# Patient Record
Sex: MALE | Race: WHITE | NOT HISPANIC OR LATINO | Employment: FULL TIME | URBAN - METROPOLITAN AREA
[De-identification: names, ages, dates, MRNs, and addresses within clinical notes are randomized per-mention and may not be internally consistent; named-entity substitution may affect disease eponyms.]

---

## 2023-01-15 ENCOUNTER — HOSPITAL ENCOUNTER (OUTPATIENT)
Dept: CT IMAGING | Facility: CLINIC | Age: 68
Discharge: HOME | End: 2023-01-15
Attending: UROLOGY | Admitting: UROLOGY
Payer: MEDICARE

## 2023-01-15 DIAGNOSIS — C61 MALIGNANT TUMOR OF PROSTATE (H): ICD-10-CM

## 2023-01-15 PROCEDURE — 74177 CT ABD & PELVIS W/CONTRAST: CPT

## 2023-01-15 PROCEDURE — 250N000011 HC RX IP 250 OP 636: Performed by: UROLOGY

## 2023-01-15 PROCEDURE — 250N000009 HC RX 250: Performed by: UROLOGY

## 2023-01-15 RX ORDER — IOPAMIDOL 755 MG/ML
111 INJECTION, SOLUTION INTRAVASCULAR ONCE
Status: COMPLETED | OUTPATIENT
Start: 2023-01-15 | End: 2023-01-15

## 2023-01-15 RX ADMIN — SODIUM CHLORIDE 72 ML: 9 INJECTION, SOLUTION INTRAVENOUS at 08:20

## 2023-01-15 RX ADMIN — IOPAMIDOL 111 ML: 755 INJECTION, SOLUTION INTRAVENOUS at 08:19

## 2023-01-19 ENCOUNTER — HOSPITAL ENCOUNTER (OUTPATIENT)
Dept: NUCLEAR MEDICINE | Facility: CLINIC | Age: 68
Setting detail: NUCLEAR MEDICINE
Discharge: HOME | End: 2023-01-19
Attending: UROLOGY
Payer: MEDICARE

## 2023-01-19 DIAGNOSIS — C61 MALIGNANT TUMOR OF PROSTATE (H): ICD-10-CM

## 2023-01-19 PROCEDURE — 78306 BONE IMAGING WHOLE BODY: CPT

## 2023-01-19 PROCEDURE — 343N000001 HC RX 343: Performed by: UROLOGY

## 2023-01-19 PROCEDURE — A9503 TC99M MEDRONATE: HCPCS | Performed by: UROLOGY

## 2023-01-19 RX ORDER — TC 99M MEDRONATE 20 MG/10ML
25 INJECTION, POWDER, LYOPHILIZED, FOR SOLUTION INTRAVENOUS ONCE
Status: COMPLETED | OUTPATIENT
Start: 2023-01-19 | End: 2023-01-19

## 2023-01-19 RX ADMIN — TC 99M MEDRONATE 27.3 MCI.: 20 INJECTION, POWDER, LYOPHILIZED, FOR SOLUTION INTRAVENOUS at 08:32

## 2023-02-22 ENCOUNTER — LAB REQUISITION (OUTPATIENT)
Dept: LAB | Facility: CLINIC | Age: 68
End: 2023-02-22

## 2023-02-22 PROCEDURE — 88342 IMHCHEM/IMCYTCHM 1ST ANTB: CPT | Mod: TC | Performed by: SPECIALIST

## 2023-04-09 ENCOUNTER — HEALTH MAINTENANCE LETTER (OUTPATIENT)
Age: 68
End: 2023-04-09

## 2023-10-26 ASSESSMENT — ENCOUNTER SYMPTOMS
PALPITATIONS: 0
FREQUENCY: 0
SORE THROAT: 0
DIARRHEA: 0
HEMATOCHEZIA: 0
MYALGIAS: 0
HEADACHES: 0
EYE PAIN: 0
HEARTBURN: 0
CONSTIPATION: 0
NERVOUS/ANXIOUS: 0
FEVER: 0
DYSURIA: 1
JOINT SWELLING: 0
WEAKNESS: 0
PARESTHESIAS: 0
ARTHRALGIAS: 0
ABDOMINAL PAIN: 0
SHORTNESS OF BREATH: 0
NAUSEA: 0
CHILLS: 0
HEMATURIA: 0
DIZZINESS: 0
COUGH: 0

## 2023-10-26 ASSESSMENT — ACTIVITIES OF DAILY LIVING (ADL): CURRENT_FUNCTION: NO ASSISTANCE NEEDED

## 2023-11-02 ENCOUNTER — OFFICE VISIT (OUTPATIENT)
Dept: FAMILY MEDICINE | Facility: CLINIC | Age: 68
End: 2023-11-02
Payer: MEDICARE

## 2023-11-02 VITALS
OXYGEN SATURATION: 97 % | HEART RATE: 67 BPM | HEIGHT: 72 IN | TEMPERATURE: 98.4 F | RESPIRATION RATE: 16 BRPM | WEIGHT: 195 LBS | SYSTOLIC BLOOD PRESSURE: 106 MMHG | BODY MASS INDEX: 26.41 KG/M2 | DIASTOLIC BLOOD PRESSURE: 70 MMHG

## 2023-11-02 DIAGNOSIS — Z00.00 ENCOUNTER FOR MEDICARE ANNUAL WELLNESS EXAM: ICD-10-CM

## 2023-11-02 DIAGNOSIS — Z23 NEED FOR VACCINATION: ICD-10-CM

## 2023-11-02 DIAGNOSIS — Z00.00 ROUTINE GENERAL MEDICAL EXAMINATION AT A HEALTH CARE FACILITY: Primary | ICD-10-CM

## 2023-11-02 DIAGNOSIS — Z13.1 SCREENING FOR DIABETES MELLITUS: ICD-10-CM

## 2023-11-02 DIAGNOSIS — Z13.220 LIPID SCREENING: ICD-10-CM

## 2023-11-02 DIAGNOSIS — Z11.59 NEED FOR HEPATITIS C SCREENING TEST: ICD-10-CM

## 2023-11-02 DIAGNOSIS — E56.9 VITAMIN DEFICIENCY: ICD-10-CM

## 2023-11-02 DIAGNOSIS — Z12.11 SCREEN FOR COLON CANCER: ICD-10-CM

## 2023-11-02 PROBLEM — C61 MALIGNANT NEOPLASM OF PROSTATE (H): Status: ACTIVE | Noted: 2023-01-19

## 2023-11-02 PROCEDURE — 91320 SARSCV2 VAC 30MCG TRS-SUC IM: CPT | Performed by: FAMILY MEDICINE

## 2023-11-02 PROCEDURE — G0439 PPPS, SUBSEQ VISIT: HCPCS | Performed by: FAMILY MEDICINE

## 2023-11-02 PROCEDURE — G0008 ADMIN INFLUENZA VIRUS VAC: HCPCS | Performed by: FAMILY MEDICINE

## 2023-11-02 PROCEDURE — 90480 ADMN SARSCOV2 VAC 1/ONLY CMP: CPT | Performed by: FAMILY MEDICINE

## 2023-11-02 PROCEDURE — 90662 IIV NO PRSV INCREASED AG IM: CPT | Performed by: FAMILY MEDICINE

## 2023-11-02 RX ORDER — CHOLECALCIFEROL (VITAMIN D3) 50 MCG
1 TABLET ORAL DAILY
COMMUNITY
Start: 2023-11-02

## 2023-11-02 RX ORDER — TAMSULOSIN HYDROCHLORIDE 0.4 MG/1
0.8 CAPSULE ORAL DAILY
COMMUNITY

## 2023-11-02 RX ORDER — ASPIRIN 81 MG/1
81 TABLET ORAL DAILY
COMMUNITY

## 2023-11-02 RX ORDER — MULTIVIT WITH MINERALS/LUTEIN
250 TABLET ORAL DAILY
COMMUNITY
Start: 2023-11-02

## 2023-11-02 ASSESSMENT — ENCOUNTER SYMPTOMS
HEMATOCHEZIA: 0
JOINT SWELLING: 0
HEARTBURN: 0
WEAKNESS: 0
COUGH: 0
MYALGIAS: 0
NERVOUS/ANXIOUS: 0
PALPITATIONS: 0
FREQUENCY: 0
PARESTHESIAS: 0
SHORTNESS OF BREATH: 0
DIZZINESS: 0
ABDOMINAL PAIN: 0
HEADACHES: 0
ARTHRALGIAS: 0
DIARRHEA: 0
NAUSEA: 0
EYE PAIN: 0
CHILLS: 0
HEMATURIA: 0
FEVER: 0
SORE THROAT: 0
DYSURIA: 1
CONSTIPATION: 0

## 2023-11-02 ASSESSMENT — ACTIVITIES OF DAILY LIVING (ADL): CURRENT_FUNCTION: NO ASSISTANCE NEEDED

## 2023-11-02 NOTE — PROGRESS NOTES
"SUBJECTIVE:   Tyrone is a 67 year old who presents for Preventive Visit.      11/2/2023     3:57 PM   Additional Questions   Roomed by SAMSON         11/2/2023     3:58 PM   Patient Reported Additional Medications   Patient reports taking the following new medications VITAMINC,D FISH OIL, BBY ASPIRIN, 2 0.4 TAMSULOSON       Are you in the first 12 months of your Medicare coverage?  No    Healthy Habits:     In general, how would you rate your overall health?  Excellent    Frequency of exercise:  4-5 days/week    Duration of exercise:  15-30 minutes    Do you usually eat at least 4 servings of fruit and vegetables a day, include whole grains    & fiber and avoid regularly eating high fat or \"junk\" foods?  Yes    Taking medications regularly:  Yes    Medication side effects:  Not applicable    Ability to successfully perform activities of daily living:  No assistance needed    Home Safety:  No safety concerns identified    Hearing Impairment:  No hearing concerns    In the past 6 months, have you been bothered by leaking of urine? Yes    In general, how would you rate your overall mental or emotional health?  Excellent    Additional concerns today:  Yes    April started radiation  at  [facility]  for prostate cancer     Working with a urologist there   Urinating 20 times a year       Blood pressure going well   Pulse normal  Exercises regularly   64 flights a day, 4 days a week     Goes to bed for many hours, but is urinating a lot           Have you ever done Advance Care Planning? (For example, a Health Directive, POLST, or a discussion with a medical provider or your loved ones about your wishes): No, advance care planning information given to patient to review.  Patient plans to discuss their wishes with loved ones or provider.        Fall risk  Fallen 2 or more times in the past year?: No  Any fall with injury in the past year?: No    Cognitive Screening   1) Repeat 3 items (Leader, Season, Table)    2) Clock draw: " NORMAL  3) 3 item recall:   Recalls 3 objects  Results: 3 items recalled: COGNITIVE IMPAIRMENT LESS LIKELY    Mini-CogTM Copyright CHRISTIAN Finnegan. Licensed by the author for use in [facility]; reprinted with permission (melba@[facility]). All rights reserved.      Do you have sleep apnea, excessive snoring or daytime drowsiness? : no    Reviewed and updated as needed this visit by clinical staff   Tobacco  Allergies               Reviewed and updated as needed this visit by Provider                 Social History     Tobacco Use    Smoking status: Former    Smokeless tobacco: Not on file   Substance Use Topics    Alcohol use: Not on file             10/26/2023     9:02 AM   Alcohol Use   Prescreen: >3 drinks/day or >7 drinks/week? Not Applicable          No data to display              Do you have a current opioid prescription? No  Do you use any other controlled substances or medications that are not prescribed by a provider? None        Current providers sharing in care for this patient include: Patient Care Team:  No Ref-Primary, Physician as PCP - General  Herbert Khalil MD as MD (Urology)    The following health maintenance items are reviewed in Epic and correct as of today:  Health Maintenance   Topic Date Due    HEPATITIS C SCREENING  Never done    LUNG CANCER SCREENING  Never done    RSV VACCINE (Pregnancy & 60+) (1 - 1-dose 60+ series) Never done    MEDICARE ANNUAL WELLNESS VISIT  Never done    Pneumococcal Vaccine: 65+ Years (1 - PCV) Never done    PHQ-2 (once per calendar year)  Never done    ZOSTER IMMUNIZATION (2 of 2) 01/06/2023    ANNUAL REVIEW OF HM ORDERS  11/02/2024    FALL RISK ASSESSMENT  11/02/2024    COLORECTAL CANCER SCREENING  12/31/2027    ADVANCE CARE PLANNING  11/02/2028    LIPID  11/03/2028    DTAP/TDAP/TD IMMUNIZATION (3 - Td or Tdap) 11/11/2032    INFLUENZA VACCINE  Completed    AORTIC ANEURYSM SCREENING (SYSTEM ASSIGNED)  Completed    COVID-19 Vaccine  Completed    IPV  "IMMUNIZATION  Aged Out    HPV IMMUNIZATION  Aged Out    MENINGITIS IMMUNIZATION  Aged Out    RSV MONOCLONAL ANTIBODY  Aged Out     Review of Systems   Constitutional:  Negative for chills and fever.   HENT:  Negative for congestion, ear pain, hearing loss and sore throat.    Eyes:  Negative for pain and visual disturbance.   Respiratory:  Negative for cough and shortness of breath.    Cardiovascular:  Negative for chest pain, palpitations and peripheral edema.   Gastrointestinal:  Negative for abdominal pain, constipation, diarrhea, heartburn, hematochezia and nausea.   Genitourinary:  Positive for dysuria and urgency. Negative for frequency, genital sores, hematuria, impotence and penile discharge.   Musculoskeletal:  Negative for arthralgias, joint swelling and myalgias.   Skin:  Negative for rash.   Neurological:  Negative for dizziness, weakness, headaches and paresthesias.   Psychiatric/Behavioral:  Negative for mood changes. The patient is not nervous/anxious.      OBJECTIVE:   /70 (BP Location: Right arm, Patient Position: Sitting, Cuff Size: Adult Regular)   Pulse 67   Temp 98.4  F (36.9  C) (Temporal)   Resp 16   Ht 1.839 m (6' 0.4\")   Wt 88.5 kg (195 lb)   SpO2 97%   BMI 26.15 kg/m   Estimated body mass index is 26.15 kg/m  as calculated from the following:    Height as of this encounter: 1.839 m (6' 0.4\").    Weight as of this encounter: 88.5 kg (195 lb).  Physical Exam    ASSESSMENT / PLAN:       ICD-10-CM    1. Routine general medical examination at a health care facility  Z00.00 REVIEW OF HEALTH MAINTENANCE PROTOCOL ORDERS      2. Screen for colon cancer  Z12.11 Colonoscopy Screening  Referral      3. Need for hepatitis C screening test  Z11.59       4. Need for vaccination  Z23 INFLUENZA VACCINE 65+ (FLUZONE HD)     COVID-19 12+ (2023-24) (PFIZER)      5. Lipid screening  Z13.220 Lipid panel reflex to direct LDL Non-fasting      6. Vitamin deficiency  E56.9 vitamin C (ASCORBIC " "ACID) 250 MG tablet     vitamin D3 (CHOLECALCIFEROL) 50 mcg (2000 units) tablet      7. Screening for diabetes mellitus  Z13.1 Basic metabolic panel  (Ca, Cl, CO2, Creat, Gluc, K, Na, BUN)          Patient has been advised of split billing requirements and indicates understanding: Yes      COUNSELING:  Reviewed preventive health counseling, as reflected in patient instructions      BMI:   Estimated body mass index is 26.15 kg/m  as calculated from the following:    Height as of this encounter: 1.839 m (6' 0.4\").    Weight as of this encounter: 88.5 kg (195 lb).   Weight management plan: Discussed healthy diet and exercise guidelines      He reports that he has quit smoking. He does not have any smokeless tobacco history on file.      Appropriate preventive services were discussed with this patient, including applicable screening as appropriate for fall prevention, nutrition, physical activity, Tobacco-use cessation, weight loss and cognition.  Checklist reviewing preventive services available has been given to the patient.      Mark Baez DO  [facility]    Identified Health Risks:  Answers submitted by the patient for this visit:  Annual Preventive Visit (Submitted on 10/26/2023)  Chief Complaint: Annual Exam:  In general, how would you rate your overall physical health?: excellent  Frequency of exercise:: 4-5 days/week  Do you usually eat at least 4 servings of fruit and vegetables a day, include whole grains & fiber, and avoid regularly eating high fat or \"junk\" foods? : Yes  Taking medications regularly:: Yes  Medication side effects:: Not applicable  Activities of Daily Living: no assistance needed  Home safety: no safety concerns identified  Hearing Impairment:: no hearing concerns  In the past 6 months, have you been bothered by leaking of urine?: Yes  abdominal pain: No  Blood in stool: No  Blood in urine: No  chest pain: No  chills: No  congestion: No  constipation: No  cough: " No  diarrhea: No  dizziness: No  ear pain: No  eye pain: No  nervous/anxious: No  fever: No  frequency: No  genital sores: No  headaches: No  hearing loss: No  heartburn: No  arthralgias: No  joint swelling: No  peripheral edema: No  mood changes: No  myalgias: No  nausea: No  dysuria: Yes  palpitations: No  Skin sensation changes: No  sore throat: No  urgency: Yes  rash: No  shortness of breath: No  visual disturbance: No  weakness: No  impotence: No  penile discharge: No  In general, how would you rate your overall mental or emotional health?: excellent  Additional concerns today:: Yes  Exercise outside of work (Submitted on 10/26/2023)  Chief Complaint: Annual Exam:  Duration of exercise:: 15-30 minutes  Information on urinary incontinence and treatment options given to patient.

## 2023-11-02 NOTE — PATIENT INSTRUCTIONS
I recommend shingrix vaccine , pneumonia vaccine at the pharmacy.  Not covered by medicare in clinic.    Patient Education   Personalized Prevention Plan  You are due for the preventive services outlined below.  Your care team is available to assist you in scheduling these services.  If you have already completed any of these items, please share that information with your care team to update in your medical record.  Health Maintenance Due   Topic Date Due     Hepatitis C Screening  Never done     LUNG CANCER SCREENING  Never done     RSV VACCINE (Pregnancy & 60+) (1 - 1-dose 60+ series) Never done     Pneumococcal Vaccine (1 - PCV) Never done     PHQ-2 (once per calendar year)  Never done     Zoster (Shingles) Vaccine (2 of 2) 01/06/2023     Bladder Training: Care Instructions  Your Care Instructions     Bladder training is used to treat urge incontinence and stress incontinence. Urge incontinence means that the need to urinate comes on so fast that you can't get to a toilet in time. Stress incontinence means that you leak urine because of pressure on your bladder. For example, it may happen when you laugh, cough, or lift something heavy.  Bladder training can increase how long you can wait before you have to urinate. It can also help your bladder hold more urine. And it can give you better control over the urge to urinate.  It is important to remember that bladder training takes a few weeks to a few months to make a difference. You may not see results right away, but don't give up.  Follow-up care is a key part of your treatment and safety. Be sure to make and go to all appointments, and call your doctor if you are having problems. It's also a good idea to know your test results and keep a list of the medicines you take.  How can you care for yourself at home?  Work with your doctor to come up with a bladder training program that is right for you. You may use one or more of the following methods.  Delayed  "urination  In the beginning, try to keep from urinating for 5 minutes after you first feel the need to go.  While you wait, take deep, slow breaths to relax. Kegel exercises can also help you delay the need to go to the bathroom.  After some practice, when you can easily wait 5 minutes to urinate, try to wait 10 minutes before you urinate.  Slowly increase the waiting period until you are able to control when you have to urinate.  Scheduled urination  Empty your bladder when you first wake up in the morning.  Schedule times throughout the day when you will urinate.  Start by going to the bathroom every hour, even if you don't need to go.  Slowly increase the time between trips to the bathroom.  When you have found a schedule that works well for you, keep doing it.  If you wake up during the night and have to urinate, do it. Apply your schedule to waking hours only.  Kegel exercises  These tighten and strengthen pelvic muscles, which can help you control the flow of urine. (If doing these exercises causes pain, stop doing them and talk with your doctor.) To do Kegel exercises:  Squeeze your muscles as if you were trying not to pass gas. Or squeeze your muscles as if you were stopping the flow of urine. Your belly, legs, and buttocks shouldn't move.  Hold the squeeze for 3 seconds, then relax for 5 to 10 seconds.  Start with 3 seconds, then add 1 second each week until you are able to squeeze for 10 seconds.  Repeat the exercise 10 times a session. Do 3 to 8 sessions a day.  When should you call for help?  Watch closely for changes in your health, and be sure to contact your doctor if:    Your incontinence is getting worse.     You do not get better as expected.   Where can you learn more?  Go to https://www.[employer].net/patiented  Enter V684 in the search box to learn more about \"Bladder Training: Care Instructions.\"  Current as of: February 28, 2023               Content Version: 13.8    9009-5215 Healthwise, " Incorporated.   Care instructions adapted under license by your healthcare professional. If you have questions about a medical condition or this instruction, always ask your healthcare professional. Healthwise, Incorporated disclaims any warranty or liability for your use of this information.

## 2023-11-03 ENCOUNTER — LAB (OUTPATIENT)
Dept: LAB | Facility: CLINIC | Age: 68
End: 2023-11-03
Attending: FAMILY MEDICINE
Payer: MEDICARE

## 2023-11-03 DIAGNOSIS — Z13.220 LIPID SCREENING: ICD-10-CM

## 2023-11-03 DIAGNOSIS — Z13.1 SCREENING FOR DIABETES MELLITUS: ICD-10-CM

## 2023-11-03 LAB
ANION GAP SERPL CALCULATED.3IONS-SCNC: 9 MMOL/L (ref 7–15)
BUN SERPL-MCNC: 14.3 MG/DL (ref 8–23)
CALCIUM SERPL-MCNC: 9.1 MG/DL (ref 8.8–10.2)
CHLORIDE SERPL-SCNC: 103 MMOL/L (ref 98–107)
CHOLEST SERPL-MCNC: 207 MG/DL
CREAT SERPL-MCNC: 0.81 MG/DL (ref 0.67–1.17)
DEPRECATED HCO3 PLAS-SCNC: 28 MMOL/L (ref 22–29)
EGFRCR SERPLBLD CKD-EPI 2021: >90 ML/MIN/1.73M2
GLUCOSE SERPL-MCNC: 82 MG/DL (ref 70–99)
HDLC SERPL-MCNC: 53 MG/DL
LDLC SERPL CALC-MCNC: 145 MG/DL
NONHDLC SERPL-MCNC: 154 MG/DL
POTASSIUM SERPL-SCNC: 3.8 MMOL/L (ref 3.4–5.3)
SODIUM SERPL-SCNC: 140 MMOL/L (ref 135–145)
TRIGL SERPL-MCNC: 45 MG/DL

## 2023-11-03 PROCEDURE — 80061 LIPID PANEL: CPT

## 2023-11-03 PROCEDURE — 36415 COLL VENOUS BLD VENIPUNCTURE: CPT

## 2023-11-03 PROCEDURE — 80048 BASIC METABOLIC PNL TOTAL CA: CPT

## 2023-11-08 ENCOUNTER — TRANSCRIBE ORDERS (OUTPATIENT)
Dept: OTHER | Age: 68
End: 2023-11-08

## 2023-11-08 DIAGNOSIS — C61 PROSTATE CANCER (H): Primary | ICD-10-CM

## 2023-11-08 DIAGNOSIS — R35.0 URINARY FREQUENCY: ICD-10-CM

## 2023-11-22 ENCOUNTER — HOSPITAL ENCOUNTER (OUTPATIENT)
Facility: AMBULATORY SURGERY CENTER | Age: 68
End: 2023-11-22
Attending: INTERNAL MEDICINE
Payer: MEDICARE

## 2023-11-22 ENCOUNTER — TELEPHONE (OUTPATIENT)
Dept: GASTROENTEROLOGY | Facility: CLINIC | Age: 68
End: 2023-11-22
Payer: MEDICARE

## 2023-11-22 NOTE — TELEPHONE ENCOUNTER
Action 2023 JTV 10:17 AM    Action Taken CSS sent an urgent request to [facility] for images to be pushed.     Action 2023 JTV 12:19 PM    Action Taken CSS received and resolved images from [facility].      MEDICAL RECORDS REQUEST   [facility]  [address] [place] [address]  PHONE: [phone]  Fax: [phone]        FUTURE VISIT INFORMATION                                                   Tyrone Workman, : 1955 scheduled for future visit at [facility]    APPOINTMENT INFORMATION:  Date: 2023  Provider:  Herbert Khalil MD  Reason for Visit/Diagnosis: Consideration of Botox.  hx prostate cancerm hx radiation therapy, urinary frequency. Has tried multiple medications    REFERRAL INFORMATION:  Referring provider:  Carol Gerardo MBBS @ [facility]      RECORDS REQUESTED FOR VISIT                                                     NOTES  STATUS/DETAILS   OFFICE NOTE from referring provider  yes, 2023 -- Carol Gerardo MBBS @ [facility]   MEDICATION LIST  yes   LABS     URINALYSIS (UA)  yes   IMAGES  yes, 2023 -- MR PELVIS    2023 -- NM BONE SCAN  01/15/2023 -- CT ABD PELVIS     PRE-VISIT CHECKLIST      Joint diagnostic appointment coordinated correctly          (ensure right order & amount of time) Yes   RECORD COLLECTION COMPLETE yes

## 2023-11-22 NOTE — TELEPHONE ENCOUNTER
"Endoscopy Scheduling Screen    Have you had a positive Covid test in the last 14 days?  No    Are you active on MyChart?   Yes    What insurance is in the chart?  Other:  MEDICARE BCBS WELLCARE    Ordering/Referring Provider:     SHAUN GLASS      (If ordering provider performs procedure, schedule with ordering provider unless otherwise instructed. )    BMI: Estimated body mass index is 26.15 kg/m  as calculated from the following:    Height as of 11/2/23: 1.839 m (6' 0.4\").    Weight as of 11/2/23: 88.5 kg (195 lb).     Sedation Ordered  moderate sedation.   If patient BMI > 50 do not schedule in [facility].    If patient BMI > 45 do not schedule at [facility].    Are you taking methadone or Suboxone?  No    Are you taking any prescription medications for pain 3 or more times per week?   No    Do you have a history of malignant hyperthermia or adverse reaction to anesthesia?  No    (Females) Are you currently pregnant?        Have you been diagnosed or told you have pulmonary hypertension?   No    Do you have an LVAD?  No    Have you been told you have moderate to severe sleep apnea?  No    Have you been told you have COPD, asthma, or any other lung disease?  No    Do you have any heart conditions?  No     Have you ever had an organ transplant?   No    Have you ever had or are you awaiting a heart or lung transplant?   No    Have you had a stroke or transient ischemic attack (TIA aka \"mini stroke\" in the last 6 months?   No    Have you been diagnosed with or been told you have cirrhosis of the liver?   No    Are you currently on dialysis?   No    Do you need assistance transferring?   No    BMI: Estimated body mass index is 26.15 kg/m  as calculated from the following:    Height as of 11/2/23: 1.839 m (6' 0.4\").    Weight as of 11/2/23: 88.5 kg (195 lb).     Is patients BMI > 40 and scheduling location [facility]?  No    Do you take an injectable medication for weight loss or diabetes (excluding insulin)?  No    Do you take the " medication Naltrexone?  No    Do you take blood thinners?  No       Prep   Are you currently on dialysis or do you have chronic kidney disease?  No    Do you have a diagnosis of diabetes?  No    Do you have a diagnosis of cystic fibrosis (CF)?  No    On a regular basis do you go 3 -5 days between bowel movements?  No    BMI > 40?  No    Preferred Pharmacy:    [id number] - [address] [address]  Phone: [phone] Fax: [phone]      Final Scheduling Details   Colonoscopy prep sent?  Standard MiraLAX    Procedure scheduled  Colonoscopy    Surgeon:  REJI     Date of procedure:  3/5     Pre-OP / PAC:   No - Not required for this site.    Location  [facility] - [facility] - Per order.    Sedation   Moderate Sedation - Per order.      Patient Reminders:   You will receive a call from a Nurse to review instructions and health history.  This assessment must be completed prior to your procedure.  Failure to complete the Nurse assessment may result in the procedure being cancelled.      On the day of your procedure, please designate an adult(s) who can drive you home stay with you for the next 24 hours. The medicines used in the exam will make you sleepy. You will not be able to drive.      You cannot take public transportation, ride share services, or non-medical taxi service without a responsible caregiver.  Medical transport services are allowed with the requirement that a responsible caregiver will receive you at your destination.  We require that drivers and caregivers are confirmed prior to your procedure.

## 2023-12-05 ENCOUNTER — MYC MEDICAL ADVICE (OUTPATIENT)
Dept: UROLOGY | Facility: CLINIC | Age: 68
End: 2023-12-05
Payer: MEDICARE

## 2023-12-05 ENCOUNTER — PRE VISIT (OUTPATIENT)
Dept: UROLOGY | Facility: CLINIC | Age: 68
End: 2023-12-05
Payer: MEDICARE

## 2023-12-05 NOTE — TELEPHONE ENCOUNTER
Reason for visit: Botox consult     Relevant information: history of prostate cancer, history of radiation, prostatitis epididymoorchitis and significant BPH, failed multiple anticholinergics     Records/imaging/labs/orders: all records available, referred by Dr. Gerardo    Pt called: No need for a call    At Rooming: flow/pvr    Carol Fuentes CMA  12/5/2023  12:27 PM

## 2023-12-11 ENCOUNTER — PRE VISIT (OUTPATIENT)
Dept: UROLOGY | Facility: CLINIC | Age: 68
End: 2023-12-11

## 2023-12-11 ENCOUNTER — OFFICE VISIT (OUTPATIENT)
Dept: UROLOGY | Facility: CLINIC | Age: 68
End: 2023-12-11
Attending: UROLOGY
Payer: MEDICARE

## 2023-12-11 VITALS
HEIGHT: 73 IN | BODY MASS INDEX: 24.65 KG/M2 | DIASTOLIC BLOOD PRESSURE: 65 MMHG | HEART RATE: 55 BPM | WEIGHT: 186 LBS | SYSTOLIC BLOOD PRESSURE: 99 MMHG

## 2023-12-11 DIAGNOSIS — C61 PROSTATE CANCER (H): ICD-10-CM

## 2023-12-11 DIAGNOSIS — R35.0 URINARY FREQUENCY: ICD-10-CM

## 2023-12-11 PROCEDURE — 99204 OFFICE O/P NEW MOD 45 MIN: CPT | Performed by: UROLOGY

## 2023-12-11 ASSESSMENT — PAIN SCALES - GENERAL: PAINLEVEL: NO PAIN (0)

## 2023-12-11 NOTE — PROGRESS NOTES
"HPI:  Tyrone Workman is a 68 year old male with a history of radiation for prostate cancer, BPH, prostatitis, and epididymoorchitis being seen for urinary frequency and Botox consult.    - radiation for prostate cancer done in May 2023  - Urinary sx started for 15 years  - frequency - urinate 30x/day  - UUI if postponing urination  - intake 100 oz fluid & 20 oz coffee, does not want to cut down intake  -nocturia x7  - no gross hematuria  - Failed flomax and anticholinergics  - patient does not want to take any more medications  - no UTIs  - history of double hernia surgery 2014  - no alcohol use   - does exercise         Reviewed previous notes from Dr. Gerardo from [facility] [place] on 11/7/23.    Exam:  BP 99/65   Pulse 55   Ht 1.854 m (6' 1\")   Wt 84.4 kg (186 lb)   BMI 24.54 kg/m    General: age-appropriate appearing male in NAD sitting in an exam chair  HEENT: Head AT/NC, EOMI, CN Grossly intact.  Resp: no respiratory distress  Back: bony spine is non-tender, flanks are non-tender.   Abdomen: Degree of obesity is none. Abdomen is soft and nontender. No organomegaly.   Neuro: grossly non focal. Normal reflexes  Skin: clear of rashes or ecchymoses. No sacral decubitus ulcer.  Motor: excellent strength throughout    Review of Imaging:  The following imaging exams were independently viewed and interpreted by me and discussed with patient:  MRI pelvis 3/27/23 with distended bladder, enlarged prostate, no no hydro    Review of Labs:  The following labs were reviewed by me and discussed with the patient:  Cr 0.8 in Oct 2023  PSA 0.32 in Oct 2023      Assessment & Plan     Tyrone Workman is a 68 year old male with a history of radiation for prostate cancer, BPH, prostatitis, and epididymoorchitis being seen for Botox consult and urinary frequency.  Discussed lifestyle modification - patient is willing to reduce fluid intake after dinner but not interested in cutting back daytime intake of water or " coffee.     Botox and neuromodulation discussed.  Recommend against Botox after radiation --> higher risk of bleeding  InterStim benefits and risks discussed.     Will cut back on evening fluid because the nocturia is what bothers him most.   Follow up in 3 month with Dr. Bell to discuss neuromodulation surgery.  -----------------------------------------------------------  Veronica Raymundo, GENTRY  [facility]     I acted as a scribe for this note. All portions of the documented history and physical were personally performed by Herbert Khalil MD as the attending physician.       =======================================================    All portions of the documented history and physical were personally performed by me as the attending physician. I have reviewed and edited the scribe's note as appropriate to reflect my personal interactions with the patient.    Herbert Khalil MD  [facility] [place]      ==========================      Additional Coding Information:    Time spent:  45 minutes spent by me on the date of the encounter doing chart review, history and exam, documentation and further activities per the note

## 2023-12-11 NOTE — NURSING NOTE
"Chief Complaint   Patient presents with    Consult For     Consideration of Botox.  hx prostate cancerm hx radiation therapy, urinary frequency. Has tried multiple medications       Blood pressure 99/65, pulse 55, height 1.854 m (6' 1\"), weight 84.4 kg (186 lb). Body mass index is 24.54 kg/m .    Patient Active Problem List   Diagnosis    Malignant neoplasm of prostate (H)       No Known Allergies    Current Outpatient Medications   Medication Sig Dispense Refill    aspirin 81 MG EC tablet Take 81 mg by mouth daily      vitamin C (ASCORBIC ACID) 250 MG tablet Take 1 tablet (250 mg) by mouth daily      vitamin D3 (CHOLECALCIFEROL) 50 mcg (2000 units) tablet Take 1 tablet (50 mcg) by mouth daily      tamsulosin (FLOMAX) 0.4 MG capsule Take 0.8 mg by mouth daily         Social History     Tobacco Use    Smoking status: Former   Vaping Use    Vaping Use: Never used       An Moseley MA  12/11/2023  4:07 PM     "

## 2023-12-11 NOTE — PATIENT INSTRUCTIONS
Please follow up with Dr. Bell in 3 months to discuss SNS    It was a pleasure meeting with you today.  Thank you for allowing me and my team the privilege of caring for you today.  YOU are the reason we are here, and I truly hope we provided you with the excellent service you deserve.  Please let us know if there is anything else we can do for you so that we can be sure you are leaving completely satisfied with your care experience.

## 2023-12-11 NOTE — LETTER
"12/11/2023       RE: Tyrone Workman  [address]  [facility] [address]     Dear Colleague,    Thank you for referring your patient, Tyrone Workman, to the [facility] [place] at [facility]. Please see a copy of my visit note below.    HPI:  Tyrone Workman is a 68 year old male with a history of radiation for prostate cancer, BPH, prostatitis, and epididymoorchitis being seen for urinary frequency and Botox consult.    - radiation for prostate cancer done in May 2023  - Urinary sx started for 15 years  - frequency - urinate 30x/day  - UUI if postponing urination  - intake 100 oz fluid & 20 oz coffee, does not want to cut down intake  -nocturia x7  - no gross hematuria  - Failed flomax and anticholinergics  - patient does not want to take any more medications  - no UTIs  - history of double hernia surgery 2014  - no alcohol use   - does exercise         Reviewed previous notes from Dr. Gerardo from [facility] on 11/7/23.    Exam:  BP 99/65   Pulse 55   Ht 1.854 m (6' 1\")   Wt 84.4 kg (186 lb)   BMI 24.54 kg/m    General: age-appropriate appearing male in NAD sitting in an exam chair  HEENT: Head AT/NC, EOMI, CN Grossly intact.  Resp: no respiratory distress  Back: bony spine is non-tender, flanks are non-tender.   Abdomen: Degree of obesity is none. Abdomen is soft and nontender. No organomegaly.   Neuro: grossly non focal. Normal reflexes  Skin: clear of rashes or ecchymoses. No sacral decubitus ulcer.  Motor: excellent strength throughout    Review of Imaging:  The following imaging exams were independently viewed and interpreted by me and discussed with patient:  MRI pelvis 3/27/23 with distended bladder, enlarged prostate, no no hydro    Review of Labs:  The following labs were reviewed by me and discussed with the patient:  Cr 0.8 in Oct 2023  PSA 0.32 in Oct 2023      Assessment & Plan     Tyrone Workman is a " 68 year old male with a history of radiation for prostate cancer, BPH, prostatitis, and epididymoorchitis being seen for Botox consult and urinary frequency.  Discussed lifestyle modification - patient is willing to reduce fluid intake after dinner but not interested in cutting back daytime intake of water or coffee.     Botox and neuromodulation discussed.  Recommend against Botox after radiation --> higher risk of bleeding  InterStim benefits and risks discussed.     Will cut back on evening fluid because the nocturia is what bothers him most.   Follow up in 3 month with Dr. Bell to discuss neuromodulation surgery.  -----------------------------------------------------------  Veronica Raymundo, GENTRY  [facility]     I acted as a scribe for this note. All portions of the documented history and physical were personally performed by Herbert Khalil MD as the attending physician.       =======================================================    All portions of the documented history and physical were personally performed by me as the attending physician. I have reviewed and edited the scribe's note as appropriate to reflect my personal interactions with the patient.    Herbert Khalil MD  [facility] [place]      ==========================      Additional Coding Information:    Time spent:  45 minutes spent by me on the date of the encounter doing chart review, history and exam, documentation and further activities per the note

## 2024-01-08 ENCOUNTER — MYC MEDICAL ADVICE (OUTPATIENT)
Dept: FAMILY MEDICINE | Facility: CLINIC | Age: 69
End: 2024-01-08
Payer: MEDICARE

## 2024-01-25 ENCOUNTER — TRANSFERRED RECORDS (OUTPATIENT)
Dept: HEALTH INFORMATION MANAGEMENT | Facility: CLINIC | Age: 69
End: 2024-01-25
Payer: MEDICARE

## 2024-02-09 ENCOUNTER — TELEPHONE (OUTPATIENT)
Dept: GASTROENTEROLOGY | Facility: CLINIC | Age: 69
End: 2024-02-09
Payer: MEDICARE

## 2024-02-09 NOTE — TELEPHONE ENCOUNTER
Caller: Tyrone  Reason for Reschedule/Cancellation (please be detailed, any staff messages or encounters to note?): Externally Performed      Prior to reschedule please review:  Ordering Provider: Nestor Awad Determined: Mod  Does patient have any ASC Exclusions, please identify?: n      Notes on Cancelled Procedure:  Procedure: Lower Endoscopy [Colonoscopy]   Date: 03/05/2024  [facility]; [address] [place] [address], 5[address] Floor, [place] [address]  Surgeon: Alma Rosa      Rescheduled: No

## 2024-02-14 ENCOUNTER — HOSPITAL ENCOUNTER (OUTPATIENT)
Facility: AMBULATORY SURGERY CENTER | Age: 69
End: 2024-02-14
Attending: INTERNAL MEDICINE
Payer: MEDICARE

## 2024-03-12 ENCOUNTER — PRE VISIT (OUTPATIENT)
Dept: UROLOGY | Facility: CLINIC | Age: 69
End: 2024-03-12
Payer: MEDICARE

## 2024-03-12 NOTE — TELEPHONE ENCOUNTER
Reason for visit: consult     Relevant information: discuss neuromodulation surgery, urinary frequency    Records/imaging/labs/orders: in epic    Pt called: No need for a call    At Rooming: tom Wilson  3/12/2024  2:39 PM

## 2024-12-29 ENCOUNTER — HEALTH MAINTENANCE LETTER (OUTPATIENT)
Age: 69
End: 2024-12-29

## 2025-03-26 ENCOUNTER — OFFICE VISIT (OUTPATIENT)
Dept: FAMILY MEDICINE | Facility: CLINIC | Age: 70
End: 2025-03-26
Payer: MEDICARE

## 2025-03-26 VITALS
OXYGEN SATURATION: 98 % | RESPIRATION RATE: 18 BRPM | WEIGHT: 192 LBS | DIASTOLIC BLOOD PRESSURE: 62 MMHG | TEMPERATURE: 97.4 F | SYSTOLIC BLOOD PRESSURE: 100 MMHG | HEIGHT: 73 IN | HEART RATE: 66 BPM | BODY MASS INDEX: 25.45 KG/M2

## 2025-03-26 DIAGNOSIS — J06.9 UPPER RESPIRATORY TRACT INFECTION, UNSPECIFIED TYPE: Primary | ICD-10-CM

## 2025-03-26 PROCEDURE — 1126F AMNT PAIN NOTED NONE PRSNT: CPT | Performed by: FAMILY MEDICINE

## 2025-03-26 PROCEDURE — 3078F DIAST BP <80 MM HG: CPT | Performed by: FAMILY MEDICINE

## 2025-03-26 PROCEDURE — 3074F SYST BP LT 130 MM HG: CPT | Performed by: FAMILY MEDICINE

## 2025-03-26 PROCEDURE — 99213 OFFICE O/P EST LOW 20 MIN: CPT | Performed by: FAMILY MEDICINE

## 2025-03-26 RX ORDER — GLUCOSAMINE/MSM/CHONDROIT SULF 500-166.6
6000 TABLET ORAL
COMMUNITY

## 2025-03-26 ASSESSMENT — PAIN SCALES - GENERAL: PAINLEVEL_OUTOF10: NO PAIN (0)

## 2025-03-26 NOTE — PROGRESS NOTES
"  Assessment & Plan     Upper respiratory tract infection, unspecified type  Improved. Possibly influenza based on how poor he felt   Advised to return if worsening           BMI  Estimated body mass index is 25.33 kg/m  as calculated from the following:    Height as of this encounter: 1.854 m (6' 1\").    Weight as of this encounter: 87.1 kg (192 lb).     Tori Hasnen is a 69 year old, presenting for the following health issues:  URI (Pt reports inconsistent flu-like symptoms from 3/22: fatigue, positional headache, cough, dry mouth/Symptoms suddenly stopped on 3/25/)        3/26/2025    10:44 AM   Additional Questions   Roomed by See ACUÑA     History of Present Illness       Reason for visit:  Fluish type symptoms  Symptom intensity:  Moderate  Symptom progression:  Worsening  Had these symptoms before:  Yes  Has tried/received treatment for these symptoms:  No  What makes it worse:  No  What makes it better:  Rest and eating   He is taking medications regularly.        Reports 3 days for feeling ill  Yesterday started feeling much better   Fatigue, malaise, headache when moving his head     He usually does not get sick so he was worried about this               Objective    /62   Pulse 66   Temp 97.4  F (36.3  C) (Temporal)   Resp 18   Ht 1.854 m (6' 1\")   Wt 87.1 kg (192 lb)   SpO2 98%   BMI 25.33 kg/m    Body mass index is 25.33 kg/m .  Physical Exam  Constitutional:       General: He is not in acute distress.  Eyes:      General: No scleral icterus.  Cardiovascular:      Rate and Rhythm: Normal rate and regular rhythm.      Heart sounds: Normal heart sounds.   Pulmonary:      Effort: No respiratory distress.      Breath sounds: Normal breath sounds.   Lymphadenopathy:      Cervical: Cervical adenopathy (very mild anterior cervical) present.   Neurological:      Mental Status: He is alert.   Psychiatric:         Mood and Affect: Mood normal.         Behavior: Behavior normal.            "     Signed Electronically by: Mark Baez DO

## 2025-04-30 ENCOUNTER — VIRTUAL VISIT (OUTPATIENT)
Dept: FAMILY MEDICINE | Facility: CLINIC | Age: 70
End: 2025-04-30
Payer: MEDICARE

## 2025-04-30 DIAGNOSIS — R53.81 MALAISE: Primary | ICD-10-CM

## 2025-04-30 PROCEDURE — 98014 SYNCH AUDIO-ONLY EST MOD 30: CPT | Performed by: FAMILY MEDICINE

## 2025-04-30 RX ORDER — HYDROXYZINE HYDROCHLORIDE 25 MG/1
25-50 TABLET, FILM COATED ORAL 3 TIMES DAILY PRN
Qty: 30 TABLET | Refills: 1 | Status: SHIPPED | OUTPATIENT
Start: 2025-04-30

## 2025-04-30 NOTE — PROGRESS NOTES
"Tyrone is a 69 year old who is being evaluated via a billable telephone visit.      Originating Location (pt. Location): Home    Distant Location (provider location):  On-site  Telephone visit completed due to the patient did not have access to video, while the distant provider did.    Assessment & Plan     Malaise:  - Symptoms of malaise with a wide differential diagnosis including TIA, CVA, infectious, rheumatologic, endocrine, and psychological causes. Extensive workup including MRI, MRA, CT scans, and blood work have not identified a clear etiology. Mild volume loss and microvascular changes noted but not considered the cause. Anxiety is suspected to be a contributing factor to symptom perception. Unlikely rheum given testing at outside clinic. Unlikely infectious give normal imaging and labs, including cbc. Endocrine unlikely given labs, history, and imaging. TIA and CVA out given imaging.   - Recommend CBC with differential to recheck mild decrease in absolute lymphocyte count. Test for hantavirus IgG due to symptoms, although it is unlikely. Referral to neurology for further evaluation. Recommend hydroxyzine 25 to 50 mg up to three times a day as needed for symptoms, suspecting an anxiety component. Follow-up in 2 weeks to assess the effectiveness of hydroxyzine and persistence of symptoms.    Consent was obtained from the patient to use an AI documentation tool in the creation of this note.          BMI  Estimated body mass index is 25.33 kg/m  as calculated from the following:    Height as of 3/26/25: 1.854 m (6' 1\").    Weight as of 3/26/25: 87.1 kg (192 lb).       Subjective   Tyrone is a 69 year old, presenting for the following health issues:  general malaise    History of Present Illness       Reason for visit:  For our 9:30am call today   consistent symptoms are   1) headache   2) tingling   3) swollen glands    He eats 0-1 servings of fruits and vegetables daily.He consumes 0 sweetened beverage(s) " daily.He exercises with enough effort to increase his heart rate 10 to 19 minutes per day.  He exercises with enough effort to increase his heart rate 4 days per week.   He is taking medications regularly.   Tyrone MARTIN Ji, 69 years    Swollen Glands  - Persistent swollen glands, described as always on overdrive and painful  - Symptoms noted to have started approximately 5-6 weeks ago    Headache  - Slight headache present during the encounter  - Previously did not experience headaches    Tingling and Numbness  - Tingling around the whole body, including toes, left eye, and fingers  - Numbness in the left foot  - Symptoms have been ongoing for several weeks    Gastrointestinal Symptoms  - Liquid diarrhea last Saturday and Sunday, believed to be related to CT scan contrast  - Stools have become softer, darker, and more frequent since Sunday  - Stomach pain occurred 5-6 times yesterday but not present during the encounter  - Previously had one normal bowel movement daily at 3:00 AM, now occurring every few hours    Phlegm  - Constantly bringing up phlegm during the day    Electrolyte Sensation  - Sensation of light crystals on the tongue ongoing for the last couple of months    General Health  - Excellent health reported for the last 10 years prior to current symptoms  - Symptoms worsen as the day progresses, with increased severity in the afternoon    Previous Medical Visits and Tests  - Last in-person visit on April 14  - Multiple tests including MRI, MRA, blood panel, chest X-ray, CT scan, and detailed blood work, all returned normal results  - Symptoms persisted despite normal test results                Objective           Vitals:  No vitals were obtained today due to virtual visit.    Physical Exam   General: Alert and no distress //Respiratory: No audible wheeze, cough, or shortness of breath // Psychiatric:  Appropriate affect, tone, and pace of words    Greater than 40 minutes spent in evaluation, chart  review, and documentation.          Phone call duration: 19 minutes  Signed Electronically by: Mark Baez DO

## 2025-04-30 NOTE — PATIENT INSTRUCTIONS
Flakito Hansen.  Here is an excerpt from my note on my thoughts     - Symptoms of malaise with a wide differential diagnosis including TIA, CVA, infectious, rheumatologic, endocrine, and psychological causes. Extensive workup including MRI, MRA, CT scans, and blood work have not identified a clear etiology. Mild volume loss and microvascular changes noted but not considered the cause. Anxiety is suspected to be a contributing factor to symptom perception. Unlikely rheum given testing at outside clinic. Unlikely infectious give normal imaging and labs, including cbc. Endocrine unlikely given labs, history, and imaging. TIA and CVA out given imaging.     Based on this, I recommend we do a few tests.  Recheck cbc for low lymphocyte count on last lab, and check a hantavirus IGG and IGM.  It is very unlikely to be hanta (very few cases ever in [place]) but chances are not zero.  The most likely cause is some underlying symptoms related to a mild virus or cold or allergies with the overlay of anxiety. I recommend that we look for other causes as planned with the labs, but also try using a medicine called hydroxyzine, similar to benadryl, to help with the likely cause of the tingling.  I think the tingling is from anxiety given the normal brain imaging.  I encourage you to try the medicine a few times, get your lab work done here, and then we see each other in a couple weeks to discuss     Mark Baez, DO

## 2025-05-14 ENCOUNTER — VIRTUAL VISIT (OUTPATIENT)
Dept: FAMILY MEDICINE | Facility: CLINIC | Age: 70
End: 2025-05-14
Payer: MEDICARE

## 2025-05-14 DIAGNOSIS — R53.81 MALAISE: Primary | ICD-10-CM

## 2025-05-14 DIAGNOSIS — R51.9 NONINTRACTABLE HEADACHE, UNSPECIFIED CHRONICITY PATTERN, UNSPECIFIED HEADACHE TYPE: ICD-10-CM

## 2025-05-14 PROCEDURE — 98014 SYNCH AUDIO-ONLY EST MOD 30: CPT | Performed by: FAMILY MEDICINE

## 2025-05-14 NOTE — PROGRESS NOTES
"Tyrone is a 69 year old who is being evaluated via a billable telephone visit.      Originating Location (pt. Location): Home    Distant Location (provider location):  On-site  Telephone visit completed due to the patient did not have access to video, while the distant provider did.    Assessment & Plan     Malaise:  - Symptoms include swollen glands, phlegm, drifting headache, and tingling. Previous tests, including CT and MRI, have not shown concerning findings. Lymph nodes appear reactive, likely due to a past viral infection. Tonsilloliths noted in CT scan of the neck.  - Referral to ENT for evaluation of tonsilloliths. Continue with scheduled appointments with infectious disease and neurology specialists.    Headache   Unclear cause     Tonsiloliths   Recommend ent   He will contact     Consent was obtained from the patient to use an AI documentation tool in the creation of this note.          BMI  Estimated body mass index is 25.33 kg/m  as calculated from the following:    Height as of 3/26/25: 1.854 m (6' 1\").    Weight as of 3/26/25: 87.1 kg (192 lb).       Subjective   Tyrone is a 69 year old, presenting for the following health issues:  headache    History of Present Illness       Reason for visit:  Follow up..past symptoms persist    He eats 0-1 servings of fruits and vegetables daily.He consumes 0 sweetened beverage(s) daily.He exercises with enough effort to increase his heart rate 10 to 19 minutes per day.  He exercises with enough effort to increase his heart rate 4 days per week.   He is taking medications regularly.   Tyrone Workman, 69 years    Swollen Glands  - Swollen glands present, throbbing at the time of the encounter  - Symptoms ongoing for almost 2 months, starting around March 20, 2025  - No suspicious lymph nodes found on CT scan of the neck    Tingling Sensation  - Tingling sensation all over the body, starting in the soles and moving up to the chest and head  - Symptoms began 6-8 months " "ago  - Tingling sometimes intense, requiring lying down    Drifting Headache  - Described as a \"drifting headache\"  - Present since March 20, 2025    Diarrhea  - Diarrhea worsened 10 days prior to the encounter but resolved by the time of the encounter  - Coincided with taking augmentin (amoxicillin clavulanic acid) from April 23 to April 30, 2025    Sinus Issues  - Phlegm from sinuses, ongoing for 16 years  - No ENT evaluation of sinus tissue prior to the encounter    Tonsilloliths  - Presence of tonsil stones noted on CT scan of the neck    Lifestyle  - Exercises regularly, including 64 flights of stairs four days a week  - High protein, almost zero added sugar diet  - Consumes raw meat with mayonnaise occasionally  - Drinks 60 ounces of water in the morning before breakfast          Objective           Vitals:  No vitals were obtained today due to virtual visit.    Physical Exam   General: Alert and no distress //Respiratory: No audible wheeze, cough, or shortness of breath // Psychiatric:  Appropriate affect, tone, and pace of words        Phone call duration: 26 minutes  Signed Electronically by: Mark Baez DO    "

## 2025-08-12 ENCOUNTER — E-VISIT (OUTPATIENT)
Dept: FAMILY MEDICINE | Facility: CLINIC | Age: 70
End: 2025-08-12
Payer: MEDICARE

## 2025-08-12 DIAGNOSIS — R53.83 FATIGUE, UNSPECIFIED TYPE: Primary | ICD-10-CM

## 2025-08-12 DIAGNOSIS — R23.3 PETECHIAE: ICD-10-CM

## 2025-08-21 ENCOUNTER — VIRTUAL VISIT (OUTPATIENT)
Dept: FAMILY MEDICINE | Facility: CLINIC | Age: 70
End: 2025-08-21
Payer: MEDICARE

## 2025-08-21 DIAGNOSIS — R23.3 PETECHIAE: ICD-10-CM

## 2025-08-21 DIAGNOSIS — R53.83 FATIGUE, UNSPECIFIED TYPE: Primary | ICD-10-CM

## 2025-08-23 ENCOUNTER — HEALTH MAINTENANCE LETTER (OUTPATIENT)
Age: 70
End: 2025-08-23

## 2025-09-02 ENCOUNTER — VIRTUAL VISIT (OUTPATIENT)
Dept: FAMILY MEDICINE | Facility: CLINIC | Age: 70
End: 2025-09-02
Payer: MEDICARE

## 2025-09-02 DIAGNOSIS — R59.9 ENLARGED LYMPH NODES: Primary | ICD-10-CM

## 2025-09-02 DIAGNOSIS — R23.3 PETECHIAE: ICD-10-CM

## 2025-09-02 DIAGNOSIS — R20.2 PARESTHESIAS: ICD-10-CM

## 2025-09-02 PROCEDURE — 98014 SYNCH AUDIO-ONLY EST MOD 30: CPT | Performed by: FAMILY MEDICINE
